# Patient Record
Sex: FEMALE | Race: WHITE | NOT HISPANIC OR LATINO | Employment: FULL TIME | URBAN - METROPOLITAN AREA
[De-identification: names, ages, dates, MRNs, and addresses within clinical notes are randomized per-mention and may not be internally consistent; named-entity substitution may affect disease eponyms.]

---

## 2020-07-21 ENCOUNTER — COMMUNICATION - HEALTHEAST (OUTPATIENT)
Dept: FAMILY MEDICINE | Facility: CLINIC | Age: 55
End: 2020-07-21

## 2020-12-05 ENCOUNTER — COMMUNICATION - HEALTHEAST (OUTPATIENT)
Dept: SCHEDULING | Facility: CLINIC | Age: 55
End: 2020-12-05

## 2021-06-16 PROBLEM — N12 PYELONEPHRITIS: Status: ACTIVE | Noted: 2020-07-18

## 2021-06-20 NOTE — LETTER
Letter by Rene Cuenca MBBS at      Author: Rene Cuenca MBBS Service: -- Author Type: --    Filed:  Encounter Date: 7/21/2020 Status: (Other)         July 21, 2020     Patient: Tati Camargo   YOB: 1965   Date of Visit: 7/21/2020       To Whom It May Concern:    It is my medical opinion that Tati Camargo may return to work on Monday, 7/27/20. She was admitted at [facility] from 7/18/20 to 7/21/20.      If you have any questions or concerns, please don't hesitate to call.        Sincerely,      Electronically signed by NILAM Herrera

## 2021-09-29 ENCOUNTER — APPOINTMENT (OUTPATIENT)
Dept: ULTRASOUND IMAGING | Facility: HOSPITAL | Age: 56
End: 2021-09-29
Attending: EMERGENCY MEDICINE
Payer: COMMERCIAL

## 2021-09-29 ENCOUNTER — HOSPITAL ENCOUNTER (EMERGENCY)
Facility: HOSPITAL | Age: 56
Discharge: HOME | End: 2021-09-29
Attending: EMERGENCY MEDICINE | Admitting: EMERGENCY MEDICINE
Payer: COMMERCIAL

## 2021-09-29 VITALS
HEART RATE: 96 BPM | OXYGEN SATURATION: 97 % | DIASTOLIC BLOOD PRESSURE: 92 MMHG | BODY MASS INDEX: 39.71 KG/M2 | SYSTOLIC BLOOD PRESSURE: 173 MMHG | WEIGHT: 246 LBS | RESPIRATION RATE: 24 BRPM | TEMPERATURE: 98.2 F

## 2021-09-29 DIAGNOSIS — R10.9 RIGHT FLANK PAIN: ICD-10-CM

## 2021-09-29 DIAGNOSIS — N28.89 DILATION OF RENAL PELVIS: ICD-10-CM

## 2021-09-29 PROBLEM — J93.9 PNEUMOTHORAX ON LEFT: Status: ACTIVE | Noted: 2020-08-17

## 2021-09-29 PROBLEM — K80.20 CHOLELITHIASIS: Status: ACTIVE | Noted: 2021-09-29

## 2021-09-29 PROBLEM — E66.01 MORBID OBESITY (H): Status: ACTIVE | Noted: 2017-04-01

## 2021-09-29 PROBLEM — F32.A ANXIETY AND DEPRESSION: Status: ACTIVE | Noted: 2017-04-01

## 2021-09-29 PROBLEM — F41.9 ANXIETY AND DEPRESSION: Status: ACTIVE | Noted: 2017-04-01

## 2021-09-29 LAB
ALBUMIN SERPL-MCNC: 4.2 G/DL (ref 3.5–5)
ALBUMIN UR-MCNC: NEGATIVE MG/DL
ALP SERPL-CCNC: 91 U/L (ref 45–120)
ALT SERPL W P-5'-P-CCNC: 19 U/L (ref 0–45)
ANION GAP SERPL CALCULATED.3IONS-SCNC: 10 MMOL/L (ref 5–18)
APPEARANCE UR: CLEAR
AST SERPL W P-5'-P-CCNC: 21 U/L (ref 0–40)
BASOPHILS # BLD AUTO: 0.1 10E3/UL (ref 0–0.2)
BASOPHILS NFR BLD AUTO: 1 %
BILIRUB DIRECT SERPL-MCNC: 0.1 MG/DL
BILIRUB SERPL-MCNC: 0.4 MG/DL (ref 0–1)
BILIRUB UR QL STRIP: NEGATIVE
BUN SERPL-MCNC: 11 MG/DL (ref 8–22)
CALCIUM SERPL-MCNC: 8.8 MG/DL (ref 8.5–10.5)
CHLORIDE BLD-SCNC: 103 MMOL/L (ref 98–107)
CO2 SERPL-SCNC: 26 MMOL/L (ref 22–31)
COLOR UR AUTO: ABNORMAL
CREAT SERPL-MCNC: 0.86 MG/DL (ref 0.6–1.1)
EOSINOPHIL # BLD AUTO: 0.1 10E3/UL (ref 0–0.7)
EOSINOPHIL NFR BLD AUTO: 2 %
ERYTHROCYTE [DISTWIDTH] IN BLOOD BY AUTOMATED COUNT: 17.7 % (ref 10–15)
GFR SERPL CREATININE-BSD FRML MDRD: 75 ML/MIN/1.73M2
GLUCOSE BLD-MCNC: 98 MG/DL (ref 70–125)
GLUCOSE UR STRIP-MCNC: NEGATIVE MG/DL
HCT VFR BLD AUTO: 36.8 % (ref 35–47)
HGB BLD-MCNC: 10.3 G/DL (ref 11.7–15.7)
HGB UR QL STRIP: NEGATIVE
IMM GRANULOCYTES # BLD: 0 10E3/UL
IMM GRANULOCYTES NFR BLD: 0 %
KETONES UR STRIP-MCNC: NEGATIVE MG/DL
LEUKOCYTE ESTERASE UR QL STRIP: NEGATIVE
LIPASE SERPL-CCNC: 41 U/L (ref 0–52)
LYMPHOCYTES # BLD AUTO: 1.3 10E3/UL (ref 0.8–5.3)
LYMPHOCYTES NFR BLD AUTO: 16 %
MCH RBC QN AUTO: 19.9 PG (ref 26.5–33)
MCHC RBC AUTO-ENTMCNC: 28 G/DL (ref 31.5–36.5)
MCV RBC AUTO: 71 FL (ref 78–100)
MONOCYTES # BLD AUTO: 0.4 10E3/UL (ref 0–1.3)
MONOCYTES NFR BLD AUTO: 5 %
MUCOUS THREADS #/AREA URNS LPF: PRESENT /LPF
NEUTROPHILS # BLD AUTO: 6.3 10E3/UL (ref 1.6–8.3)
NEUTROPHILS NFR BLD AUTO: 76 %
NITRATE UR QL: NEGATIVE
NRBC # BLD AUTO: 0 10E3/UL
NRBC BLD AUTO-RTO: 0 /100
PH UR STRIP: 6 [PH] (ref 5–7)
PLATELET # BLD AUTO: 326 10E3/UL (ref 150–450)
POTASSIUM BLD-SCNC: 3.9 MMOL/L (ref 3.5–5)
PROT SERPL-MCNC: 7.8 G/DL (ref 6–8)
RBC # BLD AUTO: 5.17 10E6/UL (ref 3.8–5.2)
RBC URINE: 0 /HPF
SODIUM SERPL-SCNC: 139 MMOL/L (ref 136–145)
SP GR UR STRIP: 1.01 (ref 1–1.03)
SQUAMOUS EPITHELIAL: <1 /HPF
UROBILINOGEN UR STRIP-MCNC: <2 MG/DL
WBC # BLD AUTO: 8.2 10E3/UL (ref 4–11)
WBC URINE: 1 /HPF

## 2021-09-29 PROCEDURE — 83690 ASSAY OF LIPASE: CPT | Performed by: EMERGENCY MEDICINE

## 2021-09-29 PROCEDURE — 36415 COLL VENOUS BLD VENIPUNCTURE: CPT | Performed by: EMERGENCY MEDICINE

## 2021-09-29 PROCEDURE — 99284 EMERGENCY DEPT VISIT MOD MDM: CPT | Mod: 25

## 2021-09-29 PROCEDURE — 250N000013 HC RX MED GY IP 250 OP 250 PS 637: Performed by: STUDENT IN AN ORGANIZED HEALTH CARE EDUCATION/TRAINING PROGRAM

## 2021-09-29 PROCEDURE — 250N000011 HC RX IP 250 OP 636: Performed by: STUDENT IN AN ORGANIZED HEALTH CARE EDUCATION/TRAINING PROGRAM

## 2021-09-29 PROCEDURE — 82310 ASSAY OF CALCIUM: CPT | Performed by: EMERGENCY MEDICINE

## 2021-09-29 PROCEDURE — 85025 COMPLETE CBC W/AUTO DIFF WBC: CPT | Performed by: EMERGENCY MEDICINE

## 2021-09-29 PROCEDURE — 81001 URINALYSIS AUTO W/SCOPE: CPT | Performed by: EMERGENCY MEDICINE

## 2021-09-29 PROCEDURE — 76705 ECHO EXAM OF ABDOMEN: CPT

## 2021-09-29 PROCEDURE — 82248 BILIRUBIN DIRECT: CPT | Performed by: EMERGENCY MEDICINE

## 2021-09-29 RX ORDER — ONDANSETRON 4 MG/1
4 TABLET, ORALLY DISINTEGRATING ORAL ONCE
Status: COMPLETED | OUTPATIENT
Start: 2021-09-29 | End: 2021-09-29

## 2021-09-29 RX ORDER — DIMENHYDRINATE 50 MG
50 TABLET ORAL AT BEDTIME
Qty: 7 TABLET | Refills: 0 | Status: SHIPPED | OUTPATIENT
Start: 2021-09-29 | End: 2021-10-06

## 2021-09-29 RX ORDER — ACETAMINOPHEN 500 MG
1000 TABLET ORAL EVERY 6 HOURS
Qty: 56 TABLET | Refills: 0 | Status: SHIPPED | OUTPATIENT
Start: 2021-09-29 | End: 2021-10-06

## 2021-09-29 RX ORDER — DIMENHYDRINATE 50 MG
50 TABLET ORAL EVERY 6 HOURS PRN
Qty: 28 TABLET | Refills: 0 | Status: SHIPPED | OUTPATIENT
Start: 2021-09-29 | End: 2021-10-06

## 2021-09-29 RX ORDER — ACETAMINOPHEN 325 MG/1
975 TABLET ORAL ONCE
Status: COMPLETED | OUTPATIENT
Start: 2021-09-29 | End: 2021-09-29

## 2021-09-29 RX ORDER — ONDANSETRON 4 MG/1
8 TABLET, ORALLY DISINTEGRATING ORAL EVERY 8 HOURS PRN
Qty: 12 TABLET | Refills: 0 | Status: SHIPPED | OUTPATIENT
Start: 2021-09-29 | End: 2021-10-02

## 2021-09-29 RX ADMIN — ACETAMINOPHEN 975 MG: 325 TABLET ORAL at 20:21

## 2021-09-29 RX ADMIN — ONDANSETRON 4 MG: 4 TABLET, ORALLY DISINTEGRATING ORAL at 20:21

## 2021-09-29 RX ADMIN — Medication 50 MG: at 20:20

## 2021-09-29 NOTE — ED TRIAGE NOTES
After lunch today, pt developed RUQ abdominal pain that has since improved, although she is still nauseous. This afternoon, she developed right sided flank pain. Pt has a history of kidney stones and has a known current gall stone.

## 2021-09-30 NOTE — DISCHARGE INSTRUCTIONS
Blood work and urine sample today look normal.  You still do have a gallstone, but this is freely mobile and there is no evidence of gallbladder infection.  On the ultrasound however the majority of your pain is over the right kidney and there is a small amount of urine backing up into the kidney.  I suspect that this is due to a kidney stone.  Your pain and nausea are well controlled here, so we do not need to obtain any additional imaging.  Recommend you follow-up with your primary care doctor if symptoms are persistent.  Zofran as needed for nausea, pain medications as needed.  Strain urine to see if you can catch a kidney stone.

## 2021-09-30 NOTE — ED PROVIDER NOTES
EMERGENCY DEPARTMENT ENCOUNTER      NAME: Tati Camargo  AGE: 56 year old female  YOB: 1965  MRN: [id number]  EVALUATION DATE & TIME: 9/29/2021  8:46 PM    PCP: Mehdi Swain    ED PROVIDER: Swathi Merlos MD    Chief Complaint   Patient presents with     Flank Pain     Abdominal Pain         FINAL IMPRESSION:  1. Right flank pain    2. Dilation of renal pelvis          ED COURSE & MEDICAL DECISION MAKING:    Pertinent Labs & Imaging studies reviewed. (See chart for details)  56 year old female with history of anxiety/depression, GERD, morbid obesity, known cholelithiasis and previous pyelonephritis status post left partial nephrectomy for mass and history of malignant thyroid tumor who presents to the Emergency Department for evaluation of right upper quadrant abdominal pain that radiates to her right flank with nausea since eating lunch. Patient has reproducible epigastric and right upper quadrant abdominal pain and to a lesser extent right-sided flank pain on exam. Differential includes gastritis, pancreatitis, hepatobiliary pathology, symptomatic cholelithiasis, UTI/pyelonephritis, ureterolithiasis.    Patient placed on monitor, IV established and blood obtained. Patient was given Zofran, Tylenol, Dramamine while in triage. CBC, BMP, LFTs, lipase notable for hemoglobin of 10.3 which is chronic. Urinalysis unremarkable.   Right upper quadrant ultrasound freely mobile gallstone.  Mild right hydronephrosis.  Symptoms highly suspicious for ureterolithiasis.  Her nausea and pain is well controlled at this time after the above and given this I do not think she warrants imaging.  Will treat with expectant management, outpatient Zofran and analgesia and PCP follow-up.  We will have her strain her urine.  Warning signs return to ED discussed.           8:55 PM I met the patient and performed my initial interview and exam.     At the conclusion of the encounter I discussed the results of all of the  tests and the disposition. The questions were answered. The patient or family acknowledged understanding and was agreeable with the care plan.      MEDICATIONS GIVEN IN THE EMERGENCY:  Medications   ondansetron (ZOFRAN-ODT) ODT tab 4 mg (4 mg Oral Given 9/29/21 2021)   dimenhyDRINATE chew tab 50 mg (50 mg Oral Given 9/29/21 2020)   acetaminophen (TYLENOL) tablet 975 mg (975 mg Oral Given 9/29/21 2021)       NEW PRESCRIPTIONS STARTED AT TODAY'S ER VISIT  New Prescriptions    ACETAMINOPHEN (TYLENOL) 500 MG TABLET    Take 2 tablets (1,000 mg) by mouth every 6 hours for 7 days    DIMENHYDRINATE (DRAMAMINE) 50 MG TABLET    Take 1 tablet (50 mg) by mouth At Bedtime for 7 days    DIMENHYDRINATE (DRAMAMINE) 50 MG TABLET    Take 1 tablet (50 mg) by mouth every 6 hours as needed for other (kidney stone pain management)    ONDANSETRON (ZOFRAN ODT) 4 MG ODT TAB    Take 2 tablets (8 mg) by mouth every 8 hours as needed for nausea          =================================================================    HPI    Patient information was obtained from: Patient    Use of Intrepreter: N/A        Tati Camargo is a 56 year old female with pertinent medical history of prediabetes, thyroid cancer, hypothyroidism, tobacco use, and morbid obesity who presents to the ED via walk in for evaluation of abdominal pain.     Patient reports she developed right upper quadrant abdominal pain after eating lunch today. She notes she had a sandwich and corn for lunch. Patient states the pain radiates to her lower right back and mildly worsens with eating. She also reports nausea. Patient notes a history of kidney stones and a partial nephrectomy. Of note, patient reports she currently has a known gallstone. She denies history of pancreas issues or appendectomy. Denies vomiting, hematuria, dysuria, groin pain, decrease in appetite, or any other complaints or concerns at this time.     REVIEW OF SYSTEMS  Constitutional:  Denies fever, chills,  "weight loss or weakness, decrease in appetite.   Respiratory: No SOB, wheeze or cough  Cardiovascular:  No CP, palpitations  GI:  Denies vomiting, diarrhea. Positive for abdominal pain (right upper), nausea.   : Denies dysuria, denies hematuria, groin pain.   Musculoskeletal:  Denies any new joint pain, swelling or loss of function. Positive for back pain (lower right).   Neurologic:  Denies headache, focal weakness or sensory changes  All other systems negative unless noted in HPI.      PAST MEDICAL HISTORY:  Past Medical History:   Diagnosis Date     Anxiety      Depressive disorder      Gallstones      Gastroesophageal reflux disease      Obesity      Renal mass      Thyroid cancer (H)        PAST SURGICAL HISTORY:  Past Surgical History:   Procedure Laterality Date     NEPHRECTOMY PARTIAL         CURRENT MEDICATIONS:    Prior to Admission Medications   Prescriptions Last Dose Informant Patient Reported? Taking?   NIACIN ORAL   Yes No   Sig: [NIACIN ORAL] Take 1 Dose by mouth daily.   buPROPion (WELLBUTRIN XL) 150 MG 24 hr tablet   Yes No   Sig: [BUPROPION (WELLBUTRIN XL) 150 MG 24 HR TABLET] Take 150 mg by mouth daily.   citalopram (CELEXA) 40 MG tablet   Yes No   Sig: [CITALOPRAM (CELEXA) 40 MG TABLET] Take 40 mg by mouth daily.   cyanocobalamin 500 MCG tablet   Yes No   Sig: [CYANOCOBALAMIN 500 MCG TABLET] Take 500 mcg by mouth daily.   levothyroxine (SYNTHROID, LEVOTHROID) 137 MCG tablet   Yes No   Sig: [LEVOTHYROXINE (SYNTHROID, LEVOTHROID) 137 MCG TABLET] Take 137 mcg by mouth daily.   rOPINIRole (REQUIP) 1 MG tablet   Yes No   Sig: [ROPINIROLE (REQUIP) 1 MG TABLET] Take 2-4 mg by mouth see administration instructions. Take 2-4 mg by mouth at bedtime as needed.      Facility-Administered Medications: None       ALLERGIES:  Allergies   Allergen Reactions     Iohexol Unknown     Patient stated,\" she got loopy\" after having the CT contrast dye.     Tramadol Unknown     Headaches       FAMILY " HISTORY:  History reviewed. No pertinent family history.    SOCIAL HISTORY:  Social History     Tobacco Use     Smoking status: Current Every Day Smoker     Packs/day: 0.50     Years: 5.00     Pack years: 2.50     Types: Cigarettes     Start date: 7/18/2020     Smokeless tobacco: Never Used     Tobacco comment: Pt has chantix   Substance Use Topics     Alcohol use: Yes     Drug use: Never        VITALS:  Patient Vitals for the past 24 hrs:   BP Temp Temp src Pulse Resp SpO2 Weight   09/29/21 1804 (!) 173/92 98.2  F (36.8  C) Oral 96 24 97 % 111.6 kg (246 lb)       PHYSICAL EXAM    General Appearance: Well-appearing, well-nourished, no acute distress  Head:  Normocephalic  Eyes:   conjunctiva/corneas clear  ENT:  membranes are moist without pallor  Neck:  Supple  Cardio:  Regular rate and rhythm  Pulm:  No respiratory distress  Back: Mild right CVA tenderness  Abdomen: Obese, soft, epigastric and primarily right upper quadrant abdominal tenderness. No rebound or guarding  Extremities: Moves all extremities normally, normal gait  Skin:  Skin warm, dry, no rashes  Neuro:  Alert and oriented ×3, moving all extremities, no gross sensory defects     RADIOLOGY/LABS:  Reviewed all pertinent imaging. Please see official radiology report. All pertinent labs reviewed and interpreted.    Results for orders placed or performed during the hospital encounter of 09/29/21   Abdomen US, limited (RUQ only)    Impression    IMPRESSION:  1.  No change in gallstones, no suggestion for acute cholecystitis. Bile ducts normal.  2.  There does appear to be borderline right hydronephrosis.  3.  Consider follow-up CT to evaluate for stones if patient's symptoms are suggestive of renal colic.       Basic metabolic panel   Result Value Ref Range    Sodium 139 136 - 145 mmol/L    Potassium 3.9 3.5 - 5.0 mmol/L    Chloride 103 98 - 107 mmol/L    Carbon Dioxide (CO2) 26 22 - 31 mmol/L    Anion Gap 10 5 - 18 mmol/L    Urea Nitrogen 11 8 - 22 mg/dL     Creatinine 0.86 0.60 - 1.10 mg/dL    Calcium 8.8 8.5 - 10.5 mg/dL    Glucose 98 70 - 125 mg/dL    GFR Estimate 75 >60 mL/min/1.73m2   Hepatic function panel   Result Value Ref Range    Bilirubin Total 0.4 0.0 - 1.0 mg/dL    Bilirubin Direct 0.1 <=0.5 mg/dL    Protein Total 7.8 6.0 - 8.0 g/dL    Albumin 4.2 3.5 - 5.0 g/dL    Alkaline Phosphatase 91 45 - 120 U/L    AST 21 0 - 40 U/L    ALT 19 0 - 45 U/L   Result Value Ref Range    Lipase 41 0 - 52 U/L   UA with Microscopic reflex to Culture    Specimen: Urine, Midstream   Result Value Ref Range    Color Urine Light Yellow Colorless, Straw, Light Yellow, Yellow    Appearance Urine Clear Clear    Glucose Urine Negative Negative mg/dL    Bilirubin Urine Negative Negative    Ketones Urine Negative Negative mg/dL    Specific Gravity Urine 1.014 1.001 - 1.030    Blood Urine Negative Negative    pH Urine 6.0 5.0 - 7.0    Protein Albumin Urine Negative Negative mg/dL    Urobilinogen Urine <2.0 <2.0 mg/dL    Nitrite Urine Negative Negative    Leukocyte Esterase Urine Negative Negative    Mucus Urine Present (A) None Seen /LPF    RBC Urine 0 <=2 /HPF    WBC Urine 1 <=5 /HPF    Squamous Epithelials Urine <1 <=1 /HPF   CBC with platelets and differential   Result Value Ref Range    WBC Count 8.2 4.0 - 11.0 10e3/uL    RBC Count 5.17 3.80 - 5.20 10e6/uL    Hemoglobin 10.3 (L) 11.7 - 15.7 g/dL    Hematocrit 36.8 35.0 - 47.0 %    MCV 71 (L) 78 - 100 fL    MCH 19.9 (L) 26.5 - 33.0 pg    MCHC 28.0 (L) 31.5 - 36.5 g/dL    RDW 17.7 (H) 10.0 - 15.0 %    Platelet Count 326 150 - 450 10e3/uL    % Neutrophils 76 %    % Lymphocytes 16 %    % Monocytes 5 %    % Eosinophils 2 %    % Basophils 1 %    % Immature Granulocytes 0 %    NRBCs per 100 WBC 0 <1 /100    Absolute Neutrophils 6.3 1.6 - 8.3 10e3/uL    Absolute Lymphocytes 1.3 0.8 - 5.3 10e3/uL    Absolute Monocytes 0.4 0.0 - 1.3 10e3/uL    Absolute Eosinophils 0.1 0.0 - 0.7 10e3/uL    Absolute Basophils 0.1 0.0 - 0.2 10e3/uL    Absolute  Immature Granulocytes 0.0 <=0.0 10e3/uL    Absolute NRBCs 0.0 10e3/uL       The creation of this record is based on the scribe s observations of the work being performed by Swathi Merlos MD and the provider s statements to them. It was created on his behalf by Evelin Rosenbaum, a trained medical scribe. This document has been checked and approved by the attending provider.    Swathi Merlos MD  Emergency Medicine  [facility] EMERGENCY DEPARTMENT  [address] [place] [id number]  [phone]  Dept: [phone]     Swathi Merlos MD  09/29/21 [id number]

## 2023-12-20 ENCOUNTER — TRANSFERRED RECORDS (OUTPATIENT)
Dept: HEALTH INFORMATION MANAGEMENT | Facility: CLINIC | Age: 58
End: 2023-12-20
Payer: COMMERCIAL